# Patient Record
Sex: FEMALE | URBAN - METROPOLITAN AREA
[De-identification: names, ages, dates, MRNs, and addresses within clinical notes are randomized per-mention and may not be internally consistent; named-entity substitution may affect disease eponyms.]

---

## 2023-08-23 ENCOUNTER — APPOINTMENT (OUTPATIENT)
Dept: URBAN - METROPOLITAN AREA SURGERY 11 | Age: 73
Setting detail: DERMATOLOGY
End: 2023-08-23

## 2023-08-23 PROBLEM — C44.91 BASAL CELL CARCINOMA OF SKIN, UNSPECIFIED: Status: ACTIVE | Noted: 2023-08-23

## 2023-08-23 PROCEDURE — OTHER MOHS SURGERY PHONE CONSULTATION: OTHER

## 2023-08-23 NOTE — PROCEDURE: MOHS SURGERY PHONE CONSULTATION
Does The Patient Have A Pacemaker Or Defibrillator?: No
Has The Pathology Report Been Received?: Yes
Additional Information?: ALLERGIES: SULFA, EPINEPHRINE
Patient Reported Location: right helix
Which Antibiotic Do They Take For Surgical Prophylaxis?: Amoxicillin (2 grams)
Office Location Of Mohs Surgery: [place]
Detail Level: Simple
Time Of Mohs Surgery: 1:00pm
Date Of Mohs Surgery: 09/11/2023

## 2023-09-11 ENCOUNTER — APPOINTMENT (OUTPATIENT)
Dept: RURAL SURGERY 2 | Age: 73
Setting detail: DERMATOLOGY
End: 2023-09-11

## 2023-09-11 PROBLEM — C44.212 BASAL CELL CARCINOMA OF SKIN OF RIGHT EAR AND EXTERNAL AURICULAR CANAL: Status: ACTIVE | Noted: 2023-09-11

## 2023-09-11 PROCEDURE — OTHER MOHS SURGERY: OTHER

## 2023-09-11 PROCEDURE — 14060 TIS TRNFR E/N/E/L 10 SQ CM/<: CPT

## 2023-09-11 PROCEDURE — 17311 MOHS 1 STAGE H/N/HF/G: CPT

## 2023-09-11 NOTE — HPI: SKIN LESION (BASAL CELL CARCINOMA)
Is This A New Presentation, Or A Follow-Up?: Basal Cell Carcinoma
Location From Outside Provider (Will Override Previously Chosen Location): Right helix
When Was Basal Cell Biopsied? (Optional): 8/9/23
Accession # (Optional): [id number]

## 2023-09-12 ENCOUNTER — APPOINTMENT (OUTPATIENT)
Dept: RURAL SURGERY 1 | Age: 73
Setting detail: DERMATOLOGY
End: 2023-09-12

## 2023-09-12 DIAGNOSIS — Z48.817 ENCOUNTER FOR SURGICAL AFTERCARE FOLLOWING SURGERY ON THE SKIN AND SUBCUTANEOUS TISSUE: ICD-10-CM

## 2023-09-12 PROCEDURE — OTHER POST-OP WOUND CHECK: OTHER

## 2023-09-12 ASSESSMENT — LOCATION DETAILED DESCRIPTION DERM: LOCATION DETAILED: RIGHT SUPERIOR POSTERIOR HELIX

## 2023-09-12 ASSESSMENT — LOCATION SIMPLE DESCRIPTION DERM: LOCATION SIMPLE: RIGHT EAR

## 2023-09-12 ASSESSMENT — LOCATION ZONE DERM: LOCATION ZONE: EAR

## 2023-09-12 NOTE — PROCEDURE: POST-OP WOUND CHECK
Detail Level: Simple
Add 61381 Cpt? (Important Note: In 2017 The Use Of 93978 Is Being Tracked By Cms To Determine Future Global Period Reimbursement For Global Periods): no
Additional Comments: Patient stated there was moments of pain with the site, especially in the night. Possibly due to the bandage. When bandage was removed, she stated it felt better. Rebandaged.

## 2023-09-19 ENCOUNTER — APPOINTMENT (OUTPATIENT)
Dept: RURAL SURGERY 1 | Age: 73
Setting detail: DERMATOLOGY
End: 2023-09-19

## 2023-09-19 DIAGNOSIS — Z48.02 ENCOUNTER FOR REMOVAL OF SUTURES: ICD-10-CM

## 2023-09-19 PROCEDURE — OTHER SUTURE REMOVAL (GLOBAL PERIOD): OTHER

## 2023-09-19 ASSESSMENT — LOCATION SIMPLE DESCRIPTION DERM: LOCATION SIMPLE: RIGHT EAR

## 2023-09-19 ASSESSMENT — LOCATION ZONE DERM: LOCATION ZONE: EAR

## 2023-09-19 ASSESSMENT — LOCATION DETAILED DESCRIPTION DERM: LOCATION DETAILED: RIGHT SUPERIOR POSTERIOR HELIX

## 2023-09-19 NOTE — PROCEDURE: SUTURE REMOVAL (GLOBAL PERIOD)
Body Location Override (Optional - Billing Will Still Be Based On Selected Body Map Location If Applicable): Right Helix
Detail Level: Detailed
Add 93957 Cpt? (Important Note: In 2017 The Use Of 62516 Is Being Tracked By Cms To Determine Future Global Period Reimbursement For Global Periods): no

## 2023-10-10 ENCOUNTER — APPOINTMENT (OUTPATIENT)
Dept: RURAL SURGERY 1 | Age: 73
Setting detail: DERMATOLOGY
End: 2023-10-10

## 2023-10-10 DIAGNOSIS — Z48.817 ENCOUNTER FOR SURGICAL AFTERCARE FOLLOWING SURGERY ON THE SKIN AND SUBCUTANEOUS TISSUE: ICD-10-CM

## 2023-10-10 PROCEDURE — OTHER POST-OP WOUND CHECK: OTHER

## 2023-10-10 ASSESSMENT — LOCATION DETAILED DESCRIPTION DERM: LOCATION DETAILED: RIGHT SUPERIOR POSTERIOR HELIX

## 2023-10-10 ASSESSMENT — LOCATION SIMPLE DESCRIPTION DERM: LOCATION SIMPLE: RIGHT EAR

## 2023-10-10 ASSESSMENT — LOCATION ZONE DERM: LOCATION ZONE: EAR

## 2023-10-10 NOTE — PROCEDURE: POST-OP WOUND CHECK
Body Location Override (Optional - Billing Will Still Be Based On Selected Body Map Location If Applicable): Right Helix
Detail Level: Simple
Add 15824 Cpt? (Important Note: In 2017 The Use Of 69196 Is Being Tracked By Cms To Determine Future Global Period Reimbursement For Global Periods): no
Additional Comments: 2 remaining sutures were removed now that site is better able to be visualized. Recommended serica scar gel if desired.

## 2024-04-03 ENCOUNTER — OFFICE (OUTPATIENT)
Dept: URBAN - METROPOLITAN AREA CLINIC 72 | Facility: CLINIC | Age: 74
End: 2024-04-03

## 2024-04-03 VITALS
DIASTOLIC BLOOD PRESSURE: 80 MMHG | HEART RATE: 98 BPM | HEIGHT: 64 IN | WEIGHT: 153.8 LBS | SYSTOLIC BLOOD PRESSURE: 140 MMHG | OXYGEN SATURATION: 99 %

## 2024-04-03 DIAGNOSIS — R10.2 PELVIC AND PERINEAL PAIN: ICD-10-CM

## 2024-04-03 DIAGNOSIS — R14.0 ABDOMINAL DISTENSION (GASEOUS): ICD-10-CM

## 2024-04-03 DIAGNOSIS — R63.4 ABNORMAL WEIGHT LOSS: ICD-10-CM

## 2024-04-03 DIAGNOSIS — R19.4 CHANGE IN BOWEL HABIT: ICD-10-CM

## 2024-04-03 PROCEDURE — 99204 OFFICE O/P NEW MOD 45 MIN: CPT | Performed by: INTERNAL MEDICINE

## 2024-04-03 RX ORDER — SODIUM SULFATE, POTASSIUM SULFATE, MAGNESIUM SULFATE 1.6; 3.13; 17.5 G/ML; G/ML; G/ML
SOLUTION, CONCENTRATE ORAL
Qty: 1 | Refills: 0 | Status: ACTIVE
Start: 2024-04-03

## 2024-04-03 NOTE — SERVICENOTES
Our goal is to partner with you to improve your health and well being. It is important for you to complete necessary testing and follow the instructions given to you at your clinic visit. Our office will call you within 2 weeks with results of any testing but you may also call sooner to obtain results - [phone].   If you have any questions or concerns please feel free to call us.  We take your care very seriously and we thank you for your trust!
- schedule CT. I would like that done by early next week. 
- , - schedule  EGD and colonoscopy, if you have any issues with the prep (ie high cost, not covered) at the pharmacy please let us know
- Follow up with NP either in person or via telehealth after procedure.

## 2024-04-03 NOTE — SERVICEHPINOTES
Erma Lynn   is seen for an initial visit today.  
br
carmen   Referred for abdominal pain
br
br She had a URI in november and had a antibiotics shot and some doxycycline.  She noted after that some upper abdominal pain and LLQ  pain and then generalized abdominal pain. She did take probiotics. There is pain in the LLQ that radiates to the rectum.  The pain gets better with lying down. She also notes some br
br Bowel habits have changed they are more frequent 2-3 times a day and thinner. No blood in the stool.  She takes CALM at night and that helps her have a BM.  She did try metamucil which just makes her more bloated.  she brenna note some bloating. br
carmen She has had some mild weight loss and notes early satiety.  br
carmen She had a colonoscopy about 5 years ago that was normal.  No polyps.  No family history of colon cancer, no family history of colon polyps.  
br
carmen She only takes meloxicam occasionaly, no other nsaids. 

br My nurse has reviewed and updated the medication list with the patient. I have reviewed the medication list along with the documented medical, social and family history. Pertinent details are also noted above in the HPI.

## 2024-04-17 ENCOUNTER — AMBULATORY SURGICAL CENTER (OUTPATIENT)
Dept: URBAN - METROPOLITAN AREA SURGERY 19 | Facility: SURGERY | Age: 74
End: 2024-04-17
Payer: MEDICARE

## 2024-04-17 ENCOUNTER — OFFICE (OUTPATIENT)
Dept: URBAN - METROPOLITAN AREA PATHOLOGY 10 | Facility: PATHOLOGY | Age: 74
End: 2024-04-17
Payer: MEDICARE

## 2024-04-17 DIAGNOSIS — K31.89 OTHER DISEASES OF STOMACH AND DUODENUM: ICD-10-CM

## 2024-04-17 DIAGNOSIS — R10.10 UPPER ABDOMINAL PAIN, UNSPECIFIED: ICD-10-CM

## 2024-04-17 DIAGNOSIS — R19.4 CHANGE IN BOWEL HABIT: ICD-10-CM

## 2024-04-17 LAB
COLONOSCOPY STUDY: (no result)
COLONOSCOPY STUDY: (no result)
RELEVANT H&P ENDOSCOPY: (no result)
RELEVANT H&P ENDOSCOPY: (no result)

## 2024-04-17 PROCEDURE — 43239 EGD BIOPSY SINGLE/MULTIPLE: CPT | Mod: 51 | Performed by: INTERNAL MEDICINE

## 2024-04-17 PROCEDURE — 45378 DIAGNOSTIC COLONOSCOPY: CPT | Performed by: INTERNAL MEDICINE

## 2024-04-17 PROCEDURE — 88305 TISSUE EXAM BY PATHOLOGIST: CPT | Performed by: STUDENT IN AN ORGANIZED HEALTH CARE EDUCATION/TRAINING PROGRAM
